# Patient Record
Sex: MALE | Race: WHITE | URBAN - METROPOLITAN AREA
[De-identification: names, ages, dates, MRNs, and addresses within clinical notes are randomized per-mention and may not be internally consistent; named-entity substitution may affect disease eponyms.]

---

## 2024-01-25 ENCOUNTER — OFFICE VISIT (OUTPATIENT)
Dept: ORTHOPEDIC SURGERY | Age: 54
End: 2024-01-25

## 2024-01-25 VITALS — TEMPERATURE: 98 F | WEIGHT: 235 LBS | HEIGHT: 70 IN | BODY MASS INDEX: 33.64 KG/M2

## 2024-01-25 DIAGNOSIS — M75.22 BICIPITAL TENDINITIS OF LEFT SHOULDER: Primary | ICD-10-CM

## 2024-01-25 DIAGNOSIS — S43.432A SUPERIOR GLENOID LABRUM LESION OF LEFT SHOULDER, INITIAL ENCOUNTER: ICD-10-CM

## 2024-01-25 NOTE — PROGRESS NOTES
Chief Complaint   Patient presents with    Shoulder Pain     Left Shoulder, 2nd opinion, [facility], had Shoulder Arthroscopy 10/05/2023 by         Titi Padilla is a 53 y.o. year old   male who is seen today  for evaluation of left shoulder pain and workers comp injury.  The patient had surgery 10/5/23 with Dr. Ribera in [place].  Patient was injured at work when he grabbed something coming off his truck and felt pop in shoulder.  Patient sought treatment 2-3 days after injury.  He states that not everything was done in the surgery.  Patient states his shoulder has been subluxing almost daily.  He states he doesn't have to do any activity and shoulder pops.      Chief Complaint   Patient presents with    Shoulder Pain     Left Shoulder, 2nd opinion, [facility], had Shoulder Arthroscopy 10/05/2023 by      No past medical history on file.  No past surgical history on file.  No current outpatient medications on file.  No Known Allergies  Social History     Socioeconomic History    Marital status: Unknown     Spouse name: Not on file    Number of children: Not on file    Years of education: Not on file    Highest education level: Not on file   Occupational History    Not on file   Tobacco Use    Smoking status: Not on file    Smokeless tobacco: Not on file   Substance and Sexual Activity    Alcohol use: Not on file    Drug use: Not on file    Sexual activity: Not on file   Other Topics Concern    Not on file   Social History Narrative    Not on file     Social Determinants of Health     Financial Resource Strain: Not on file   Food Insecurity: Not on file   Transportation Needs: Not on file   Physical Activity: Not on file   Stress: Not on file   Social Connections: Not on file   Intimate Partner Violence: Not on file   Housing Stability: Not on file     No family history on file.    REVIEW OF SYSTEMS:     General/Constitution:  (-)weight loss, (-)fever, (-)chills, (-)weakness.   Skin: (-) rash,(-)

## 2024-01-30 DIAGNOSIS — S43.402A SPRAIN OF LEFT SHOULDER, UNSPECIFIED SHOULDER SPRAIN TYPE, INITIAL ENCOUNTER: Primary | ICD-10-CM

## 2024-02-19 ENCOUNTER — OFFICE VISIT (OUTPATIENT)
Dept: ORTHOPEDIC SURGERY | Age: 54
End: 2024-02-19
Payer: COMMERCIAL

## 2024-02-19 VITALS — HEIGHT: 70 IN | BODY MASS INDEX: 33.64 KG/M2 | TEMPERATURE: 98 F | WEIGHT: 235 LBS

## 2024-02-19 DIAGNOSIS — M75.22 BICIPITAL TENDINITIS OF LEFT SHOULDER: Primary | ICD-10-CM

## 2024-02-19 PROCEDURE — 99213 OFFICE O/P EST LOW 20 MIN: CPT | Performed by: ORTHOPAEDIC SURGERY

## 2024-02-19 NOTE — PROGRESS NOTES
Chief Complaint   Patient presents with    Shoulder Pain     WC Left shoulder MRI follow up. Shoulder remains similar to previous visit.         Titi Padilla is a 53 y.o. year old   male who is seen today for a follow up of his recent MRI.  He had shoulder surgery performed by Dr. Julian 10/5/23.      Chief Complaint   Patient presents with    Shoulder Pain     WC Left shoulder MRI follow up. Shoulder remains similar to previous visit.      No past medical history on file.  No past surgical history on file.  No current outpatient medications on file.  No Known Allergies  Social History     Socioeconomic History    Marital status: Single     Spouse name: Not on file    Number of children: Not on file    Years of education: Not on file    Highest education level: Not on file   Occupational History    Not on file   Tobacco Use    Smoking status: Not on file    Smokeless tobacco: Not on file   Substance and Sexual Activity    Alcohol use: Not on file    Drug use: Not on file    Sexual activity: Not on file   Other Topics Concern    Not on file   Social History Narrative    Not on file     Social Determinants of Health     Financial Resource Strain: Not on file   Food Insecurity: Not on file   Transportation Needs: Not on file   Physical Activity: Not on file   Stress: Not on file   Social Connections: Not on file   Intimate Partner Violence: Not on file   Housing Stability: Not on file     No family history on file.    REVIEW OF SYSTEMS:     General/Constitution:  (-)weight loss, (-)fever, (-)chills, (-)weakness.   Skin: (-) rash,(-) psoriasis,(-) eczema, (-)skin cancer.   Musculoskeletal: (-) fractures,  (-) dislocations,(-) collagen vascular disease, (-) fibromyalgia, (-) multiple sclerosis, (-) muscular dystrophy, (-) RSD,(-) joint pain (-)swelling, (-) joint pain,swelling.  Neurologic: (-) epilepsy, (-)seizures,(-) brain tumor,(-) TIA, (-)stroke, (-)headaches, (-)Parkinson disease,(-) memory loss, (-)

## 2024-03-01 ENCOUNTER — TELEPHONE (OUTPATIENT)
Dept: ORTHOPEDIC SURGERY | Age: 54
End: 2024-03-01

## 2024-04-01 ENCOUNTER — OFFICE VISIT (OUTPATIENT)
Dept: ORTHOPEDIC SURGERY | Age: 54
End: 2024-04-01
Payer: COMMERCIAL

## 2024-04-01 DIAGNOSIS — M75.22 BICIPITAL TENDINITIS OF LEFT SHOULDER: Primary | ICD-10-CM

## 2024-04-01 DIAGNOSIS — S43.432A SUPERIOR GLENOID LABRUM LESION OF LEFT SHOULDER, INITIAL ENCOUNTER: ICD-10-CM

## 2024-04-01 PROCEDURE — 20610 DRAIN/INJ JOINT/BURSA W/O US: CPT | Performed by: ORTHOPAEDIC SURGERY

## 2024-04-01 PROCEDURE — 99214 OFFICE O/P EST MOD 30 MIN: CPT | Performed by: ORTHOPAEDIC SURGERY

## 2024-04-01 RX ORDER — TRIAMCINOLONE ACETONIDE 40 MG/ML
40 INJECTION, SUSPENSION INTRA-ARTICULAR; INTRAMUSCULAR ONCE
Status: COMPLETED | OUTPATIENT
Start: 2024-04-01 | End: 2024-04-01

## 2024-04-01 RX ADMIN — TRIAMCINOLONE ACETONIDE 40 MG: 40 INJECTION, SUSPENSION INTRA-ARTICULAR; INTRAMUSCULAR at 11:28

## 2024-04-01 NOTE — PROGRESS NOTES
Chief Complaint   Patient presents with    Shoulder Pain     Left shoulder gh [employer] workers comp approved wants to talk to doctor before getting injection PT is making the shoulder worse.        Titi Padilla is a 53 y.o. year old   male who is seen today for what is suppose to be an injection visit, however the patient has multiple questions about treatment.    Chief Complaint   Patient presents with    Shoulder Pain     Left shoulder [employer] workers comp approved wants to talk to doctor before getting injection PT is making the shoulder worse.     No past medical history on file.  No past surgical history on file.  No current outpatient medications on file.  No Known Allergies  Social History     Socioeconomic History    Marital status: Single     Spouse name: Not on file    Number of children: Not on file    Years of education: Not on file    Highest education level: Not on file   Occupational History    Not on file   Tobacco Use    Smoking status: Not on file    Smokeless tobacco: Not on file   Substance and Sexual Activity    Alcohol use: Not on file    Drug use: Not on file    Sexual activity: Not on file   Other Topics Concern    Not on file   Social History Narrative    Not on file     Social Determinants of Health     Financial Resource Strain: Not on file   Food Insecurity: Not on file   Transportation Needs: Not on file   Physical Activity: Not on file   Stress: Not on file   Social Connections: Not on file   Intimate Partner Violence: Not on file   Housing Stability: Not on file     No family history on file.    REVIEW OF SYSTEMS:     General/Constitution:  (-)weight loss, (-)fever, (-)chills, (-)weakness.   Skin: (-) rash,(-) psoriasis,(-) eczema, (-)skin cancer.   Musculoskeletal: (-) fractures,  (-) dislocations,(-) collagen vascular disease, (-) fibromyalgia, (-) multiple sclerosis, (-) muscular dystrophy, (-) RSD,(-) joint pain (-)swelling, (-) joint pain,swelling.  Neurologic: (-) epilepsy,

## 2024-04-11 ENCOUNTER — TELEPHONE (OUTPATIENT)
Dept: PHYSICAL THERAPY | Age: 54
End: 2024-04-11

## 2024-04-11 PROBLEM — S43.432A SUPERIOR GLENOID LABRUM LESION OF LEFT SHOULDER: Status: ACTIVE | Noted: 2024-04-11

## 2024-04-11 PROBLEM — M75.82 TENDINITIS OF LEFT ROTATOR CUFF: Status: ACTIVE | Noted: 2024-04-11

## 2024-04-11 PROBLEM — M75.22 BICIPITAL TENDINITIS OF LEFT SHOULDER: Status: ACTIVE | Noted: 2024-04-11

## 2024-04-11 NOTE — TELEPHONE ENCOUNTER
Date: 2024       Patient Name: Titi Padilla  : 1970      MRN: [id number]    No show/no call for PT evaluation scheduled at 1230 today.    Gulshan Roque, PT

## 2024-04-16 ENCOUNTER — EVALUATION (OUTPATIENT)
Dept: PHYSICAL THERAPY | Age: 54
End: 2024-04-16

## 2024-04-16 DIAGNOSIS — M75.82 OTHER SHOULDER LESIONS, LEFT SHOULDER: ICD-10-CM

## 2024-04-16 DIAGNOSIS — M75.22 BICIPITAL TENDINITIS OF LEFT SHOULDER: Primary | ICD-10-CM

## 2024-04-16 DIAGNOSIS — S43.432A SUPERIOR GLENOID LABRUM LESION OF LEFT SHOULDER, INITIAL ENCOUNTER: ICD-10-CM

## 2024-04-16 NOTE — PROGRESS NOTES
Observations: well nourished male, Class 1 obesity (BMI of 30 to < 35), normal affect    Inspection: scapular dyskinesis noted with irregular scapulohumeral rhythm left shoulder      Joint/Motion:    Neck:  Neck AROM is WNL and non-provocative     Shoulder:    Right Shoulder:  AROM: 155° Forward elevation,  70° ER,  IR to L PSIS  PROM: 165° Forward elevation,  90° ER,  40° IR pain     Left Shoulder:  AROM: 170° Forward elevation,  90° ER,  IR to T9  PROM: 180° Forward elevation,  95° ER , 45° IR    Strength:  Right Shoulder: Flexion 5/5,  Abduction 5/5, ER 5/5, IR 5/5      Left Shoulder: Flexion 4/5,  Abduction 4/5, ER 4/5, IR 4/5 pain on all muscle testing    Palpation: Tender to palpation anterior shoulder, vertebral border of scapula.    Special Tests/Functional Screens:    [] Anabel []+ / [] -  [] [place]'s []+ / [] -   []  [place]'s []+ / [] -    []GH drawer []+ / [] -    [] Bicep Load []+ / [] -   [] Crank []+ / [] -  [] Guillermo []+ / [] -   [] Elbow Varus []+ / [] -  [] Neer's []+ / [] -      [] Speed's []+ / [] -   []Sulcus Sign []+ / [] -   [] Apprehension []+ / [] -   [] Bicep Load II []+ / [] -   [] Jovita []+ / [] -   [] Elbow Valgus []+ / [] -     [] Other: []+ / [] -         ASSESSMENT     Problems:   Pain 9/10 constant, waxing / waning  ROM decreased  Strength decreased   Limitations with use of left upper extremity, reaching, lifting, carrying      [x] There are no barriers affecting plan of care or recovery    [] Barriers to this patient's plan of care or recovery include:    Domestic Concerns:  [x] No  [] Yes:    Short Term goals ( 9 visits )  Pain 5/10 intermittent  /85/L1  Strength 4+/5   Able to perform / complete the following functions / tasks: reach / lift / carry light weighted items in performance of home or work demands    Long Term goals ( 18 visits )  Pain 2-3/10 infrequent  /90/T8  Strength 5-/5   Able to perform / complete the following functions / tasks: reach

## 2024-04-18 ENCOUNTER — TREATMENT (OUTPATIENT)
Dept: PHYSICAL THERAPY | Age: 54
End: 2024-04-18

## 2024-04-18 DIAGNOSIS — S43.432A SUPERIOR GLENOID LABRUM LESION OF LEFT SHOULDER, INITIAL ENCOUNTER: Primary | ICD-10-CM

## 2024-04-18 DIAGNOSIS — M75.22 BICIPITAL TENDINITIS OF LEFT SHOULDER: ICD-10-CM

## 2024-04-18 DIAGNOSIS — M75.82 OTHER SHOULDER LESIONS, LEFT SHOULDER: ICD-10-CM

## 2024-04-18 NOTE — PROGRESS NOTES
Physical Therapy Daily Treatment Note    Date: 2024  Patient Name: Titi Padilla  : 1970   MRN: [id number]  DOInjury: 2022  DOSx: 10/5/2023 bicep tenodesis and SAD Dr Ribera    Referring Provider: [place] [address]     Medical Diagnosis:   M75.82 (ICD-10-CM) - Other shoulder lesions, left shoulder  S43.432A (ICD-10-CM) - Superior glenoid labrum lesion of left shoulder, initial encounter  M75.22 (ICD-10-CM) - Bicipital tendinitis, left shoulder    X = TO BE PERFORMED NEXT VISIT  > = PROGRESS TO THIS    S: See eval  O: Discussed anatomy, physiology, body mechanics, principles of loading, and progressive loading/activity.  Reviewed home exercise program extensively; written copy provided.   Access Code: [email or web address]  URL: [email or web address]  Date: 2024  Prepared by: Regan Kaufman    Exercises  - Supine Shoulder Press with Dowel  - 3 x daily - 7 x weekly - 2 sets - 15 reps  - Seated Overhead Press with Bar  - 3 x daily - 7 x weekly - 2 sets - 15 reps  - Standing Shoulder External Internal Rotation AAROM with Dowel  - 3 x daily - 7 x weekly - 2 sets - 15 reps  - Standing Shoulder Scaption  - 3 x daily - 7 x weekly - 2 sets - 15 reps  - Standing Bilateral Shoulder Internal Rotation AAROM with Dowel  - 3 x daily - 7 x weekly - 2 sets - 15 reps    Time 1099-9579     Visit 2 Repeat outcome measure at mid point and end.    Pain 4/10     ROM Right Shoulder:  AROM: 155° Forward elevation,  70° ER,  IR to L PSIS  PROM: 165° Forward elevation,  90° ER,  40° IR pain       Left Shoulder:  AROM: 170° Forward elevation,  90° ER,  IR to T9  PROM: 180° Forward elevation,  95° ER , 45° IR    Modalities       Use sparingly if at all.  Prefer an active program.  MO   Manual         MT         Stretch      Table slides   TE   Wall Flexion   TE   Wall ER stretch   TE   Towel IR stretch   TE   IR reaching behind back   TE   Exercise      Shoulder scaption isometrics

## 2024-04-22 ENCOUNTER — TREATMENT (OUTPATIENT)
Dept: PHYSICAL THERAPY | Age: 54
End: 2024-04-22
Payer: COMMERCIAL

## 2024-04-22 DIAGNOSIS — M75.22 BICIPITAL TENDINITIS OF LEFT SHOULDER: ICD-10-CM

## 2024-04-22 DIAGNOSIS — M75.82 OTHER SHOULDER LESIONS, LEFT SHOULDER: ICD-10-CM

## 2024-04-22 DIAGNOSIS — S43.432A SUPERIOR GLENOID LABRUM LESION OF LEFT SHOULDER, INITIAL ENCOUNTER: Primary | ICD-10-CM

## 2024-04-22 PROCEDURE — 97110 THERAPEUTIC EXERCISES: CPT | Performed by: PHYSICAL THERAPIST

## 2024-04-24 ENCOUNTER — TREATMENT (OUTPATIENT)
Dept: PHYSICAL THERAPY | Age: 54
End: 2024-04-24

## 2024-04-24 DIAGNOSIS — S43.432A SUPERIOR GLENOID LABRUM LESION OF LEFT SHOULDER, INITIAL ENCOUNTER: Primary | ICD-10-CM

## 2024-04-24 DIAGNOSIS — M75.82 OTHER SHOULDER LESIONS, LEFT SHOULDER: ICD-10-CM

## 2024-04-24 DIAGNOSIS — M75.22 BICIPITAL TENDINITIS OF LEFT SHOULDER: ICD-10-CM

## 2024-04-24 NOTE — PROGRESS NOTES
Physical Therapy Daily Treatment Note    Date: 2024  Patient Name: Titi Padilla  : 1970   MRN: [id number]  DOInjury: 2022  DOSx: 10/5/2023 bicep tenodesis and SAD Dr Ribera    Referring Provider: [place] [address]     Medical Diagnosis:   M75.82 (ICD-10-CM) - Other shoulder lesions, left shoulder  S43.432A (ICD-10-CM) - Superior glenoid labrum lesion of left shoulder, initial encounter  M75.22 (ICD-10-CM) - Bicipital tendinitis, left shoulder    X = TO BE PERFORMED NEXT VISIT  > = PROGRESS TO THIS    S: Pt reports 8/10 pain today. States pain usually sits around 7-8/10 daily. States tried to mow with zero turn tractor yesterday, after about 20 min he stopped due to shoulder pain.  O: Discussed anatomy, physiology, body mechanics, principles of loading, and progressive loading/activity.  Reviewed home exercise program extensively; written copy provided.   Access Code: [email or web address]  URL: [email or web address]  Date: 2024  Prepared by: Regan Kaufman    Exercises  - Supine Shoulder Press with Dowel  - 3 x daily - 7 x weekly - 2 sets - 15 reps  - Seated Overhead Press with Bar  - 3 x daily - 7 x weekly - 2 sets - 15 reps  - Standing Shoulder External Internal Rotation AAROM with Dowel  - 3 x daily - 7 x weekly - 2 sets - 15 reps  - Standing Shoulder Scaption  - 3 x daily - 7 x weekly - 2 sets - 15 reps  - Standing Bilateral Shoulder Internal Rotation AAROM with Dowel  - 3 x daily - 7 x weekly - 2 sets - 15 reps    Time 0800-     Visit 4 Repeat outcome measure at mid point and end.    Pain 8/10     ROM Right Shoulder:  AROM: 155° Forward elevation,  70° ER,  IR to L PSIS  PROM: 165° Forward elevation,  90° ER,  40° IR pain       Left Shoulder:  AROM: 170° Forward elevation,  90° ER,  IR to T9  PROM: 180° Forward elevation,  95° ER , 45° IR    Modalities       Use sparingly if at all.  Prefer an active program.  MO   Manual         MT         Stretch

## 2024-04-26 ENCOUNTER — TELEPHONE (OUTPATIENT)
Dept: PHYSICAL THERAPY | Age: 54
End: 2024-04-26
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
## 2024-04-29 ENCOUNTER — TELEPHONE (OUTPATIENT)
Dept: PHYSICAL THERAPY | Age: 54
End: 2024-04-29

## 2024-04-29 NOTE — TELEPHONE ENCOUNTER
Date: 2024       Patient Name: Titi Padilla  : 1970      MRN: [id number]    Patient canceled PT follow-up appointment scheduled for today  at 9:00.    Ela Cotton PTA

## 2024-04-29 NOTE — PROGRESS NOTES
Shoulder ER isometrics       Shrugs AROM   TE   Pendulum Ex   TE   Pulleys - flex 3 min  TE   Pulleys-IR   TE   Supine wand chest press 2 x 15  TE   Supine wand flex   TE   Supine wand ER/IR   TE   Standing wand behind back IR 2 x 15  TE   Supine flexion   TE   scaption To 45deg 2 x 15  TE   Standing wand press 2 x 15  TE   Standing wand ER 2 x 15  TE   ROWS: H   TA   ROWS: M   TA   ROWS: L   TA   ER (towel roll under arm)   TE   IR (towel roll under arm)   TE               A:  Tolerated well.    P: Continue with rehab plan  Regan Kaufman PT    Treatment Charges: Mins Units   Initial Evaluation     Re-Evaluation     Ther Exercise         TE 45 3   Manual Therapy     MT     Ther Activities        TA     Gait Training          GT     Neuro Re-education NR     Modalities     Non-Billable Service Time     Other     Total Time/Units 45 3

## 2024-05-01 ENCOUNTER — TREATMENT (OUTPATIENT)
Dept: PHYSICAL THERAPY | Age: 54
End: 2024-05-01

## 2024-05-01 DIAGNOSIS — S43.432A SUPERIOR GLENOID LABRUM LESION OF LEFT SHOULDER, INITIAL ENCOUNTER: Primary | ICD-10-CM

## 2024-05-01 DIAGNOSIS — M75.22 BICIPITAL TENDINITIS OF LEFT SHOULDER: ICD-10-CM

## 2024-05-01 DIAGNOSIS — M75.82 OTHER SHOULDER LESIONS, LEFT SHOULDER: ICD-10-CM
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
## 2024-05-06 ENCOUNTER — TREATMENT (OUTPATIENT)
Dept: PHYSICAL THERAPY | Age: 54
End: 2024-05-06
Payer: COMMERCIAL

## 2024-05-06 DIAGNOSIS — M75.22 BICIPITAL TENDINITIS OF LEFT SHOULDER: ICD-10-CM

## 2024-05-06 DIAGNOSIS — S43.432A SUPERIOR GLENOID LABRUM LESION OF LEFT SHOULDER, INITIAL ENCOUNTER: Primary | ICD-10-CM

## 2024-05-06 DIAGNOSIS — M75.82 OTHER SHOULDER LESIONS, LEFT SHOULDER: ICD-10-CM

## 2024-05-06 PROCEDURE — 97112 NEUROMUSCULAR REEDUCATION: CPT

## 2024-05-06 NOTE — PROGRESS NOTES
Physical Therapy Daily Treatment Note    Date: 2024  Patient Name: Titi Padilla  : 1970   MRN: [id number]  DOInjury: 2022  DOSx: 10/5/2023 bicep tenodesis and SAD Dr Ribera    Referring Provider: [place] [address]     Medical Diagnosis:   M75.82 (ICD-10-CM) - Other shoulder lesions, left shoulder  S43.432A (ICD-10-CM) - Superior glenoid labrum lesion of left shoulder, initial encounter  M75.22 (ICD-10-CM) - Bicipital tendinitis, left shoulder    X = TO BE PERFORMED NEXT VISIT  > = PROGRESS TO THIS    S: Pt reports 4-5/10 left shoulder pain. Pt states he rolled his tractor yesterday; neck and shoulder are sore but he is otherwise ok.  O: Discussed anatomy, physiology, body mechanics, principles of loading, and progressive loading/activity.  Reviewed home exercise program extensively; written copy provided.   Access Code: [email or web address]  URL: [email or web address]  Date: 2024  Prepared by: Regan Kaufman    Exercises  - Supine Shoulder Press with Dowel  - 3 x daily - 7 x weekly - 2 sets - 15 reps  - Seated Overhead Press with Bar  - 3 x daily - 7 x weekly - 2 sets - 15 reps  - Standing Shoulder External Internal Rotation AAROM with Dowel  - 3 x daily - 7 x weekly - 2 sets - 15 reps  - Standing Shoulder Scaption  - 3 x daily - 7 x weekly - 2 sets - 15 reps  - Standing Bilateral Shoulder Internal Rotation AAROM with Dowel  - 3 x daily - 7 x weekly - 2 sets - 15 reps    Time 1039-1741     Visit   Claim # [id number]  Dx: M75.82, S43.432A, M75.22  DOI: 3/24/22   approved 18 visits through 2024  Repeat outcome measure at mid point and end.    Pain 4-5/10     ROM Right Shoulder:  AROM: 155° Forward elevation,  70° ER,  IR to L PSIS  PROM: 165° Forward elevation,  90° ER,  40° IR pain       Left Shoulder:  AROM: 170° Forward elevation,  90° ER,  IR to T9  PROM: 180° Forward elevation,  95° ER , 45° IR    Modalities        MO   Manual         MT

## 2024-05-08 ENCOUNTER — TREATMENT (OUTPATIENT)
Dept: PHYSICAL THERAPY | Age: 54
End: 2024-05-08
Payer: COMMERCIAL

## 2024-05-08 DIAGNOSIS — M75.22 BICIPITAL TENDINITIS OF LEFT SHOULDER: ICD-10-CM

## 2024-05-08 DIAGNOSIS — S43.432A SUPERIOR GLENOID LABRUM LESION OF LEFT SHOULDER, INITIAL ENCOUNTER: Primary | ICD-10-CM

## 2024-05-08 DIAGNOSIS — M75.82 OTHER SHOULDER LESIONS, LEFT SHOULDER: ICD-10-CM

## 2024-05-08 PROCEDURE — 97112 NEUROMUSCULAR REEDUCATION: CPT

## 2024-05-08 NOTE — PROGRESS NOTES
Physical Therapy Daily Treatment Note    Date: 2024  Patient Name: Titi Padilla  : 1970   MRN: [id number]  DOInjury: 2022  DOSx: 10/5/2023 bicep tenodesis and SAD Dr Ribera    Referring Provider: [place] [address]     Medical Diagnosis:   M75.82 (ICD-10-CM) - Other shoulder lesions, left shoulder  S43.432A (ICD-10-CM) - Superior glenoid labrum lesion of left shoulder, initial encounter  M75.22 (ICD-10-CM) - Bicipital tendinitis, left shoulder    X = TO BE PERFORMED NEXT VISIT  > = PROGRESS TO THIS    S: Pt reports increased 7/10 left shoulder pain. Pt states he did a lot yesterday (mowed lawn, painted, moved/lift boxes). Pt c/o feeling that shoulder is popping in/out of place  O: Discussed anatomy, physiology, body mechanics, principles of loading, and progressive loading/activity.  Reviewed home exercise program extensively; written copy provided.   Access Code: [email or web address]  URL: [email or web address]  Date: 2024  Prepared by: Regan Kaufman    Exercises  - Supine Shoulder Press with Dowel  - 3 x daily - 7 x weekly - 2 sets - 15 reps  - Seated Overhead Press with Bar  - 3 x daily - 7 x weekly - 2 sets - 15 reps  - Standing Shoulder External Internal Rotation AAROM with Dowel  - 3 x daily - 7 x weekly - 2 sets - 15 reps  - Standing Shoulder Scaption  - 3 x daily - 7 x weekly - 2 sets - 15 reps  - Standing Bilateral Shoulder Internal Rotation AAROM with Dowel  - 3 x daily - 7 x weekly - 2 sets - 15 reps    Time 8903-1869     Visit   Claim # [id number]  Dx: M75.82, S43.432A, M75.22  DOI: 3/24/22   approved 18 visits through 2024  Repeat outcome measure at mid point and end.    Pain 4-5/10     ROM Right Shoulder:  AROM: 155° Forward elevation,  70° ER,  IR to L PSIS  PROM: 165° Forward elevation,  90° ER,  40° IR pain       Left Shoulder:  AROM: 170° Forward elevation,  90° ER,  IR to T9  PROM: 180° Forward elevation,  95° ER , 45° IR

## 2024-05-10 ENCOUNTER — TREATMENT (OUTPATIENT)
Dept: PHYSICAL THERAPY | Age: 54
End: 2024-05-10

## 2024-05-10 DIAGNOSIS — M75.82 OTHER SHOULDER LESIONS, LEFT SHOULDER: ICD-10-CM

## 2024-05-10 DIAGNOSIS — S43.432A SUPERIOR GLENOID LABRUM LESION OF LEFT SHOULDER, INITIAL ENCOUNTER: Primary | ICD-10-CM

## 2024-05-10 DIAGNOSIS — M75.22 BICIPITAL TENDINITIS OF LEFT SHOULDER: ICD-10-CM

## 2024-05-10 NOTE — PROGRESS NOTES
Physical Therapy Daily Treatment Note    Date: 5/10/2024  Patient Name: Titi Padilla  : 1970   MRN: [id number]  DOInjury: 2022  DOSx: 10/5/2023 bicep tenodesis and SAD Dr Ribera    Referring Provider: [place] [address]     Medical Diagnosis:   M75.82 (ICD-10-CM) - Other shoulder lesions, left shoulder  S43.432A (ICD-10-CM) - Superior glenoid labrum lesion of left shoulder, initial encounter  M75.22 (ICD-10-CM) - Bicipital tendinitis, left shoulder    X = TO BE PERFORMED NEXT VISIT  > = PROGRESS TO THIS    S: Pt reports increased 7/10 left shoulder pain.feels he is 65% improved from eval  O: Discussed anatomy, physiology, body mechanics, principles of loading, and progressive loading/activity.  Reviewed home exercise program extensively; written copy provided.   Access Code: [email or web address]  URL: [email or web address]  Date: 2024  Prepared by: Regan Kaufman    Exercises  - Supine Shoulder Press with Dowel  - 3 x daily - 7 x weekly - 2 sets - 15 reps  - Seated Overhead Press with Bar  - 3 x daily - 7 x weekly - 2 sets - 15 reps  - Standing Shoulder External Internal Rotation AAROM with Dowel  - 3 x daily - 7 x weekly - 2 sets - 15 reps  - Standing Shoulder Scaption  - 3 x daily - 7 x weekly - 2 sets - 15 reps  - Standing Bilateral Shoulder Internal Rotation AAROM with Dowel  - 3 x daily - 7 x weekly - 2 sets - 15 reps    Time 1027-9013     Visit   Claim # [id number]  Dx: M75.82, S43.432A, M75.22  DOI: 3/24/22   approved 18 visits through 2024  Repeat outcome measure at mid point and end.    Pain 4-5/10     ROM Right Shoulder:  AROM: 155° Forward elevation,  70° ER,  IR to L PSIS  PROM: 165° Forward elevation,  90° ER,  40° IR pain       Left Shoulder:  AROM: 170° Forward elevation,  90° ER,  IR to T9  PROM: 180° Forward elevation,  95° ER , 45° IR    Modalities        MO   Manual         MT         Stretch      Table slides   TE   Wall Flexion   TE

## 2024-05-13 ENCOUNTER — TELEPHONE (OUTPATIENT)
Dept: PHYSICAL THERAPY | Age: 54
End: 2024-05-13

## 2024-05-15 ENCOUNTER — TREATMENT (OUTPATIENT)
Dept: PHYSICAL THERAPY | Age: 54
End: 2024-05-15
Payer: COMMERCIAL

## 2024-05-15 DIAGNOSIS — S43.432A SUPERIOR GLENOID LABRUM LESION OF LEFT SHOULDER, INITIAL ENCOUNTER: Primary | ICD-10-CM

## 2024-05-15 DIAGNOSIS — M75.22 BICIPITAL TENDINITIS OF LEFT SHOULDER: ICD-10-CM

## 2024-05-15 DIAGNOSIS — M75.82 OTHER SHOULDER LESIONS, LEFT SHOULDER: ICD-10-CM

## 2024-05-15 PROCEDURE — 97112 NEUROMUSCULAR REEDUCATION: CPT

## 2024-05-15 NOTE — PROGRESS NOTES
Physical Therapy Daily Treatment Note    Date: 5/15/2024  Patient Name: Titi Padilla  : 1970   MRN: [id number]  DOInjury: 2022  DOSx: 10/5/2023 bicep tenodesis and SAD Dr Ribera    Referring Provider: [place] [address]     Medical Diagnosis:   M75.82 (ICD-10-CM) - Other shoulder lesions, left shoulder  S43.432A (ICD-10-CM) - Superior glenoid labrum lesion of left shoulder, initial encounter  M75.22 (ICD-10-CM) - Bicipital tendinitis, left shoulder    X = TO BE PERFORMED NEXT VISIT  > = PROGRESS TO THIS    S: Pt reports 6/10 left shoulder pain.  O: Discussed anatomy, physiology, body mechanics, principles of loading, and progressive loading/activity.  Reviewed home exercise program extensively; written copy provided.   Access Code: [email or web address]  URL: [email or web address]  Date: 2024  Prepared by: Regan Kaufman    Exercises  - Supine Shoulder Press with Dowel  - 3 x daily - 7 x weekly - 2 sets - 15 reps  - Seated Overhead Press with Bar  - 3 x daily - 7 x weekly - 2 sets - 15 reps  - Standing Shoulder External Internal Rotation AAROM with Dowel  - 3 x daily - 7 x weekly - 2 sets - 15 reps  - Standing Shoulder Scaption  - 3 x daily - 7 x weekly - 2 sets - 15 reps  - Standing Bilateral Shoulder Internal Rotation AAROM with Dowel  - 3 x daily - 7 x weekly - 2 sets - 15 reps    Time 0800-     Visit   Claim # [id number]  Dx: M75.82, S43.432A, M75.22  DOI: 3/24/22  Wc approved 18 visits through 2024  Repeat outcome measure at mid point and end.    Pain 4-5/10     ROM Right Shoulder:  AROM: 155° Forward elevation,  70° ER,  IR to L PSIS  PROM: 165° Forward elevation,  90° ER,  40° IR pain       Left Shoulder:  AROM: 170° Forward elevation,  90° ER,  IR to T9  PROM: 180° Forward elevation,  95° ER , 45° IR    Modalities        MO   Manual         MT         Stretch      Table slides   TE   Wall Flexion   TE   Wall ER stretch   TE   Towel IR stretch   TE

## 2024-05-17 ENCOUNTER — TREATMENT (OUTPATIENT)
Dept: PHYSICAL THERAPY | Age: 54
End: 2024-05-17

## 2024-05-17 DIAGNOSIS — M75.22 BICIPITAL TENDINITIS OF LEFT SHOULDER: ICD-10-CM

## 2024-05-17 DIAGNOSIS — M75.82 OTHER SHOULDER LESIONS, LEFT SHOULDER: ICD-10-CM

## 2024-05-17 DIAGNOSIS — S43.432A SUPERIOR GLENOID LABRUM LESION OF LEFT SHOULDER, INITIAL ENCOUNTER: Primary | ICD-10-CM

## 2024-05-17 NOTE — PROGRESS NOTES
Physical Therapy Daily Treatment Note    Date: 2024  Patient Name: Titi Padilla  : 1970   MRN: [id number]  DOInjury: 2022  DOSx: 10/5/2023 bicep tenodesis and SAD Dr Ribera    Referring Provider: [place] [address]     Medical Diagnosis:   M75.82 (ICD-10-CM) - Other shoulder lesions, left shoulder  S43.432A (ICD-10-CM) - Superior glenoid labrum lesion of left shoulder, initial encounter  M75.22 (ICD-10-CM) - Bicipital tendinitis, left shoulder    X = TO BE PERFORMED NEXT VISIT  > = PROGRESS TO THIS    S: Pt reports 6/10 left shoulder pain.  O: Discussed anatomy, physiology, body mechanics, principles of loading, and progressive loading/activity.  Reviewed home exercise program extensively; written copy provided.   Access Code: [email or web address]  URL: [email or web address]  Date: 2024  Prepared by: Regan Kaufman    Exercises  - Supine Shoulder Press with Dowel  - 3 x daily - 7 x weekly - 2 sets - 15 reps  - Seated Overhead Press with Bar  - 3 x daily - 7 x weekly - 2 sets - 15 reps  - Standing Shoulder External Internal Rotation AAROM with Dowel  - 3 x daily - 7 x weekly - 2 sets - 15 reps  - Standing Shoulder Scaption  - 3 x daily - 7 x weekly - 2 sets - 15 reps  - Standing Bilateral Shoulder Internal Rotation AAROM with Dowel  - 3 x daily - 7 x weekly - 2 sets - 15 reps    Time 0800-     Visit   Claim # [id number]  Dx: M75.82, S43.432A, M75.22  DOI: 3/24/22  Wc approved 18 visits through 2024  Repeat outcome measure at mid point and end.    Pain 4-5/10     ROM Right Shoulder:  AROM: 155° Forward elevation,  70° ER,  IR to L PSIS  PROM: 165° Forward elevation,  90° ER,  40° IR pain       Left Shoulder:  AROM: 170° Forward elevation,  90° ER,  IR to T9  PROM: 180° Forward elevation,  95° ER , 45° IR    Modalities        MO   Manual         MT         Stretch      Table slides   TE   Wall Flexion   TE   Wall ER stretch   TE   Towel IR stretch   TE

## 2024-05-20 ENCOUNTER — OFFICE VISIT (OUTPATIENT)
Dept: ORTHOPEDIC SURGERY | Age: 54
End: 2024-05-20
Payer: COMMERCIAL

## 2024-05-20 VITALS — HEIGHT: 70 IN | TEMPERATURE: 98 F | WEIGHT: 235 LBS | BODY MASS INDEX: 33.64 KG/M2

## 2024-05-20 DIAGNOSIS — M75.22 BICIPITAL TENDINITIS OF LEFT SHOULDER: Primary | ICD-10-CM

## 2024-05-20 DIAGNOSIS — S43.432A SUPERIOR GLENOID LABRUM LESION OF LEFT SHOULDER, INITIAL ENCOUNTER: ICD-10-CM

## 2024-05-20 PROCEDURE — 99213 OFFICE O/P EST LOW 20 MIN: CPT | Performed by: ORTHOPAEDIC SURGERY

## 2024-05-20 NOTE — PROGRESS NOTES
and motor and sensation is intact to median, ulnar, and radial, musclocutaneus, and axillary nerve distribution and grossly symmetric bilaterally.  There is cap refill noted less than two seconds in all digits. There is not edema of the bilateral upper extremities.  There is not varicosities noted in the distal extremities.      Lymph:  Upon palpation,  there is no lymphadenopathy noted in bilateral upper extremities.      Musculoskeletal:  Gait: normal; examination of the nails and digits reveal no cyanosis or clubbing.      Cervical Exam:  On physical exam, Titi Padilla is well-developed, well-nourished, oriented to person, place and time.  his gait is normal.  On evaluation of hiscervical spine, He has full range of motion of the cervical spine without pain.  There is no cervical tenderness to palpation.     Shoulder Exam:  On evaluation of his bilaterally upper extremities, his left shoulder has no deformity.  There is tenderness upon palpation of the anterior and lateral shoulder.  There is not evidence of scapular dyskinesis.  There is not muscle atrophy in shoulder girdle. The range of motion for the Right Shoulder is 170/50/T and for the Left shoulder is 120/25/BL.  Right shoulder Motor strength is 5/5 in the supraspinatus, 5/5 internal rotation and 5/5 in external rotation, and Left shoulder motor strength 5-/5 in supraspinatus, 5-/5 in internal rotation, 5-/5 in external rotation.        Right shoulder:  negative Impingement , negative Matthew ,negative  Speeds,negative  Apprehension ,negative Muñoz Load Shift, negative Paula manuver, negative Cross arm test.     Left shoulder:  positive Impingement , positive Matthew ,positive  Speeds,negative  Apprehension ,negative Muñoz Load Shift, negative Paula manuver, negative Cross arm test.     XRAY:  not performed    MRI:    No full-thickness rotator cuff tear.  Small to moderate-sized interstitial  tearing of the infraspinatus tendon.     Prior

## 2024-05-22 ENCOUNTER — TREATMENT (OUTPATIENT)
Dept: PHYSICAL THERAPY | Age: 54
End: 2024-05-22
Payer: COMMERCIAL

## 2024-05-22 DIAGNOSIS — M75.22 BICIPITAL TENDINITIS OF LEFT SHOULDER: ICD-10-CM

## 2024-05-22 DIAGNOSIS — S43.432A SUPERIOR GLENOID LABRUM LESION OF LEFT SHOULDER, INITIAL ENCOUNTER: Primary | ICD-10-CM

## 2024-05-22 DIAGNOSIS — M75.82 OTHER SHOULDER LESIONS, LEFT SHOULDER: ICD-10-CM

## 2024-05-22 PROCEDURE — 97110 THERAPEUTIC EXERCISES: CPT | Performed by: PHYSICAL THERAPIST

## 2024-05-22 NOTE — PROGRESS NOTES
Physical Therapy Daily Treatment Note    Date: 2024  Patient Name: Titi Padilla  : 1970   MRN: [id number]  DOInjury: 2022  DOSx: 10/5/2023 bicep tenodesis and SAD Dr Ribera    Referring Provider: [place] [address]     Medical Diagnosis:   M75.82 (ICD-10-CM) - Other shoulder lesions, left shoulder  S43.432A (ICD-10-CM) - Superior glenoid labrum lesion of left shoulder, initial encounter  M75.22 (ICD-10-CM) - Bicipital tendinitis, left shoulder    X = TO BE PERFORMED NEXT VISIT  > = PROGRESS TO THIS    S: Pt reports 6/10 left shoulder pain.  O: Discussed anatomy, physiology, body mechanics, principles of loading, and progressive loading/activity.  Reviewed home exercise program extensively; written copy provided.   Access Code: [email or web address]  URL: [email or web address]  Date: 2024  Prepared by: Regan Kaufman    Exercises  - Supine Shoulder Press with Dowel  - 3 x daily - 7 x weekly - 2 sets - 15 reps  - Seated Overhead Press with Bar  - 3 x daily - 7 x weekly - 2 sets - 15 reps  - Standing Shoulder External Internal Rotation AAROM with Dowel  - 3 x daily - 7 x weekly - 2 sets - 15 reps  - Standing Shoulder Scaption  - 3 x daily - 7 x weekly - 2 sets - 15 reps  - Standing Bilateral Shoulder Internal Rotation AAROM with Dowel  - 3 x daily - 7 x weekly - 2 sets - 15 reps    Time 0800-     Visit   Claim # [id number]  Dx: M75.82, S43.432A, M75.22  DOI: 3/24/22  Wc approved 18 visits through 2024  Repeat outcome measure at mid point and end.    Pain 4-5/10     ROM Right Shoulder:  AROM: 155° Forward elevation,  70° ER,  IR to L PSIS  PROM: 165° Forward elevation,  90° ER,  40° IR pain       Left Shoulder:  AROM: 170° Forward elevation,  90° ER,  IR to T9  PROM: 180° Forward elevation,  95° ER , 45° IR    Modalities        MO   Manual         MT         Stretch      Table slides   TE   Wall Flexion   TE   Wall ER stretch   TE   Towel IR stretch   TE

## 2024-07-02 ENCOUNTER — OFFICE VISIT (OUTPATIENT)
Dept: ORTHOPEDIC SURGERY | Age: 54
End: 2024-07-02

## 2024-07-02 VITALS — BODY MASS INDEX: 33.64 KG/M2 | WEIGHT: 235 LBS | TEMPERATURE: 98 F | HEIGHT: 70 IN

## 2024-07-02 DIAGNOSIS — M75.22 BICIPITAL TENDINITIS OF LEFT SHOULDER: Primary | ICD-10-CM

## 2024-07-02 DIAGNOSIS — S43.432A SUPERIOR GLENOID LABRUM LESION OF LEFT SHOULDER, INITIAL ENCOUNTER: ICD-10-CM
